# Patient Record
Sex: MALE | URBAN - METROPOLITAN AREA
[De-identification: names, ages, dates, MRNs, and addresses within clinical notes are randomized per-mention and may not be internally consistent; named-entity substitution may affect disease eponyms.]

---

## 2024-09-16 ENCOUNTER — NURSE TRIAGE (OUTPATIENT)
Dept: ADMINISTRATIVE | Facility: CLINIC | Age: 24
End: 2024-09-16

## 2024-09-16 NOTE — TELEPHONE ENCOUNTER
Spoke with pt calling from [facility] received oral sex from partner with genital herpes. Asking if he can still get STD from partner. Question answer based on CAre Advise. Pt advised to follow up with Provider regarding sexual heal. Pt verbalized understanding      Reason for Disposition   Herpes Simplex (Genital), questions about    Protocols used: STD [id number]-T-AL